# Patient Record
Sex: MALE | Race: WHITE
[De-identification: names, ages, dates, MRNs, and addresses within clinical notes are randomized per-mention and may not be internally consistent; named-entity substitution may affect disease eponyms.]

---

## 2018-02-15 ENCOUNTER — HOSPITAL ENCOUNTER (EMERGENCY)
Dept: HOSPITAL 7 - FB.ED | Age: 38
Discharge: HOME | End: 2018-02-15
Payer: MEDICARE

## 2018-02-15 VITALS — SYSTOLIC BLOOD PRESSURE: 128 MMHG | DIASTOLIC BLOOD PRESSURE: 83 MMHG

## 2018-02-15 DIAGNOSIS — E11.9: ICD-10-CM

## 2018-02-15 DIAGNOSIS — Z87.891: ICD-10-CM

## 2018-02-15 DIAGNOSIS — L03.213: Primary | ICD-10-CM

## 2018-02-16 NOTE — ER
DATE SEEN:  02/15/2018

 

TIME SEEN:  2245 hours.

 

CHIEF COMPLAINT:  Swelling of the left eye.

 

HISTORY OF PRESENT ILLNESS:  This is a 37-year-old male with swelling of the

left eye that started suddenly today with no trauma.  Has been mildly

irritating, and clear drainage has been noted that is thick, sometimes purulent.

As such, it has become fuzzy to see a bit blurry.  There has been no headache.

No nausea, vomiting, fever, or chills.

 

PAST MEDICAL HISTORY:  Type 2 diabetes.

 

ALLERGIES:  No known allergies.

 

SOCIAL HISTORY:  Smoker.

 

PHYSICAL EXAMINATION:  Vital Signs:  His blood pressure is normal.  He is

afebrile.  Eyes:  Conjunctivae are red.  Eyelids are swollen, there is

periorbital edema on the left.  Pupils otherwise equal and reactive, and

extraocular movements are intact.  Visual acuity is 20/50 on the left and 20/20

on the right.

 

IMPRESSION:  Periorbital cellulitis.

 

PLAN:  Warm compresses.  Cephalexin 500 mg p.o. t.i.d.  I advised him to see

Optometry tomorrow morning.  Return to the ED with any worsening of symptoms or

new symptoms.

 

Job#: [id number]

DD: 02/15/2018 2226

DT: 02/16/2018 0411 TN/AB

## 2018-05-27 ENCOUNTER — HOSPITAL ENCOUNTER (EMERGENCY)
Dept: HOSPITAL 7 - FB.ED | Age: 38
Discharge: HOME | End: 2018-05-27
Payer: MEDICARE

## 2018-05-27 VITALS — SYSTOLIC BLOOD PRESSURE: 133 MMHG | DIASTOLIC BLOOD PRESSURE: 61 MMHG

## 2018-05-27 DIAGNOSIS — J20.8: Primary | ICD-10-CM

## 2018-05-27 DIAGNOSIS — E11.9: ICD-10-CM

## 2018-05-27 DIAGNOSIS — F17.200: ICD-10-CM

## 2018-05-27 DIAGNOSIS — Z79.899: ICD-10-CM

## 2018-05-27 DIAGNOSIS — F32.9: ICD-10-CM

## 2018-05-27 DIAGNOSIS — F41.9: ICD-10-CM

## 2018-05-27 DIAGNOSIS — Z79.82: ICD-10-CM

## 2018-05-27 DIAGNOSIS — Z88.8: ICD-10-CM

## 2018-05-27 DIAGNOSIS — J45.909: ICD-10-CM

## 2018-05-27 DIAGNOSIS — I10: ICD-10-CM

## 2018-05-27 PROCEDURE — 94640 AIRWAY INHALATION TREATMENT: CPT

## 2018-05-27 PROCEDURE — 99283 EMERGENCY DEPT VISIT LOW MDM: CPT

## 2018-05-27 PROCEDURE — 71046 X-RAY EXAM CHEST 2 VIEWS: CPT

## 2018-05-27 NOTE — EDM.PDOC
ED HPI GENERAL MEDICAL PROBLEM





- General


Chief Complaint: Respiratory Problem


Stated Complaint: COUGH


Time Seen by Provider: 05/27/18 20:20


Source of Information: Reports: Patient, Family


History Limitations: Reports: No Limitations





- History of Present Illness


INITIAL COMMENTS - FREE TEXT/NARRATIVE: 





38 y.o.w.m vitaly a h/o asthma, smoker, came to the ed with acute onset of 

productive cough since yesterday. Pt has SOB when walking 4-5 blocks. No F/C/N/V

, dizziness or any other acute medical issues.  /71 RR 18 Pulse ox 95% on 

RA Temp 36.7 Pulse 86


Onset: Today


Onset Date: 05/27/18


Onset Time: 07:00


Duration: Hour(s):, Getting Worse, Intermittent


Location: Reports: Chest


Quality: Reports: Burning


Severity: Mild


Improves with: Reports: Rest


Worsens with: Reports: Movement


Context: Reports: Other (smoker)


Associated Symptoms: Reports: Cough, Shortness of Breath





- Related Data


 Allergies











Allergy/AdvReac Type Severity Reaction Status Date / Time


 


methylphenidate HCl Allergy  Anxiety Verified 05/27/18 21:14





[From Ritalin]     


 


pemoline [From Cylert] Allergy  Anxiety Verified 05/27/18 21:14











Home Meds: 


 Home Meds





Aspirin [Bonny Chewable] 81 mg PO DAILY 08/23/14 [History]


Fish Oil/Omega-3 Fatty Acids [Fish Oil] 3 cap PO DAILY 08/23/14 [History]


Lisinopril 10 mg PO DAILY 08/23/14 [History]


Multivitamin [Multivitamins] 1 each PO DAILY 08/23/14 [History]


Albuterol [Proair HFA] 2 puff IH Q4HR PRN 01/30/16 [History]


metFORMIN [Glucophage] 1,000 mg PO BID 01/30/16 [History]


Budesonide/Formoterol [Symbicort 160-4.5 MCG] 2 puff INH BID PRN 05/27/18 [

History]


Sulfamethoxazole/Trimethoprim [Bactrim Ds Tablet] 1 each PO BID #20 tablet 05/27 /18 [Rx]











Past Medical History





- Past Health History


Medical/Surgical History: Denies Medical/Surgical History


HEENT History: Reports: Impaired Vision


Cardiovascular History: Reports: Hypertension


Respiratory History: Reports: Asthma


Psychiatric History: Reports: Anxiety, Depression


Endocrine/Metabolic History: Reports: Diabetes, Type II





Social & Family History





- Family History


Family Medical History: Noncontributory





- Caffeine Use


Caffeine Use: Reports: Soda, Tea





ED ROS GENERAL





- Review of Systems


Review Of Systems: See Below


Constitutional: Reports: No Symptoms


HEENT: Reports: No Symptoms


Respiratory: Reports: Shortness of Breath, Wheezing, Cough, Sputum


Cardiovascular: Reports: No Symptoms


Endocrine: Reports: No Symptoms


GI/Abdominal: Reports: No Symptoms


: Reports: No Symptoms


Musculoskeletal: Reports: No Symptoms


Skin: Reports: No Symptoms


Neurological: Reports: No Symptoms


Psychiatric: Reports: No Symptoms


Hematologic/Lymphatic: Reports: No Symptoms


Immunologic: Reports: No Symptoms





ED EXAM, GENERAL





- Physical Exam


Exam: See Below


Exam Limited By: No Limitations


General Appearance: Alert, WD/WN, Mild Distress


Eye Exam: Bilateral Eye: Normal Inspection


Ears: Normal External Exam, Normal Canal, Hearing Grossly Normal


Ear Exam: Bilateral Ear: Auricle Normal


Nose: Normal Inspection, Normal Mucosa, No Blood


Throat/Mouth: Normal Inspection, Normal Lips, Normal Gums, Normal Oropharynx, 

Normal Voice, No Airway Compromise


Head: Atraumatic, Normocephalic


Neck: Normal Inspection, Supple, Non-Tender, Full Range of Motion


Respiratory/Chest: No Accessory Muscle Use, Chest Non-Tender, Respiratory 

Distress, Rhonchi, Wheezing


Cardiovascular: Normal Peripheral Pulses, Regular Rate, Rhythm, No Edema, No 

Gallop, No JVD, No Murmur, No Rub


Peripheral Pulses: 1+: Radial (R)


GI/Abdominal: Normal Bowel Sounds, Soft, Non-Tender, No Organomegaly, No 

Distention, No Abnormal Bruit, No Mass, Pelvis Stable


 (Male) Exam: Deferred


Rectal (Males) Exam: Deferred


Back Exam: Normal Inspection, Full Range of Motion


Extremities: Normal Inspection, Normal Range of Motion, Non-Tender, No Pedal 

Edema, Normal Capillary Refill


Neurological: Alert, Oriented, CN II-XII Intact, Normal Cognition, Normal Gait, 

No Motor/Sensory Deficits


Psychiatric: Normal Affect, Normal Mood


Skin Exam: Warm, Dry, Intact, Normal Color, No Rash


Lymphatic: No Adenopathy





Course





- Vital Signs


Text/Narrative:: 





38 y.o.w.m witth a h/o asthma, smoker, came to the ed with acute onset of 

productive cough since yesterday. Pt has SOB when walking 4-5 blocks. No F/C/N/V

, dizziness or any other acute medical issues.  /71 RR 18 Pulse ox 95% on 

RA Temp 36.7 Pulse 86


PE: 38 y.o.w.m  smoker with prod cough, acute onset


Imaging: CXR: NAD


Impression: Acute bronchitis, Asthma


Tx: Duineb, Bactrim DS


Reexam: Improved


Plan: D/C with instructions


Last Recorded V/S: 


 Last Vital Signs











Temp  36.8 C   05/27/18 21:30


 


Pulse  72   05/27/18 21:30


 


Resp  16   05/27/18 21:30


 


BP  133/61   05/27/18 21:30


 


Pulse Ox  97   05/27/18 21:30














- Orders/Labs/Meds


Orders: 


 Active Orders 24 hr











 Category Date Time Status


 


 RT Aerosol Therapy [RC] ASDIRECTED Care  05/27/18 20:28 Active


 


 Chest 2V [CR] Stat Exams  05/27/18 20:28 Taken











Meds: 


Medications














Discontinued Medications














Generic Name Dose Route Start Last Admin





  Trade Name Anneliese  PRN Reason Stop Dose Admin


 


Albuterol/Ipratropium  3 ml  05/27/18 20:28  05/27/18 20:33





  Duoneb 3.0-0.5 Mg/3 Ml  NEB  05/27/18 20:29  3 ml





  ONETIME ONE   Administration





     





     





     





     


 


Trimethoprim/Sulfamethoxazole  1 tab  05/27/18 21:18  05/27/18 22:09





  Septra Ds  PO  05/27/18 21:19  Not Given





  ONETIME ONE   





     





     





     





     














Departure





- Departure


Time of Disposition: 21:19


Disposition: Home, Self-Care 01


Condition: Good


Clinical Impression: 


Acute bronchitis


Qualifiers:


 Bronchitis organism: other organism Qualified Code(s): J20.8 - Acute 

bronchitis due to other specified organisms








- Discharge Information


Prescriptions: 


Sulfamethoxazole/Trimethoprim [Bactrim Ds Tablet] 1 each PO BID #20 tablet


Referrals: 


Romario Granger MD [Primary Care Provider] - 


Forms:  ED Department Discharge


Additional Instructions: 


Please quit tobacco use, please take the Abx as recommended, please use your 

albuterol inhalers every 4 hours a mary, please follow up with your PMD, come 

back if your symptoms get worse acutely. 





- My Orders


Last 24 Hours: 


My Active Orders





05/27/18 20:28


RT Aerosol Therapy [RC] ASDIRECTED 


Chest 2V [CR] Stat 














- Assessment/Plan


Last 24 Hours: 


My Active Orders





05/27/18 20:28


RT Aerosol Therapy [RC] ASDIRECTED 


Chest 2V [CR] Stat

## 2018-05-29 NOTE — CR
INDICATION:  Short of breath.



CHEST:  PA and lateral views of the chest were obtained 05/27/2018 - no 
comparisons.  Heart, mediastinum, and bony thorax were unremarkable.  A 
definite active infiltrate or effusion was not identified; however, bronchial 
wall cuffing is noted in the lower lung fields, which may be on the basis of 
active peribronchial disease and should be correlate clinically.



Prominent AP diameter and slightly flattened diaphragm leaf on the lateral view 
raises question of obstructive airway disease additionally.



IMPRESSION:  Bronchial wall cuffing with possible obstructive airway disease - 
correlate clinically.  

MTDD

## 2018-09-26 ENCOUNTER — HOSPITAL ENCOUNTER (EMERGENCY)
Dept: HOSPITAL 7 - FB.ED | Age: 38
Discharge: HOME | End: 2018-09-26
Payer: MEDICARE

## 2018-09-26 VITALS — SYSTOLIC BLOOD PRESSURE: 136 MMHG | DIASTOLIC BLOOD PRESSURE: 76 MMHG

## 2018-09-26 DIAGNOSIS — B34.9: Primary | ICD-10-CM

## 2018-09-26 DIAGNOSIS — Z88.8: ICD-10-CM

## 2018-09-26 DIAGNOSIS — I10: ICD-10-CM

## 2018-09-26 DIAGNOSIS — E66.9: ICD-10-CM

## 2018-09-26 PROCEDURE — 99283 EMERGENCY DEPT VISIT LOW MDM: CPT

## 2018-09-26 NOTE — EDM.PDOC
ED HPI GENERAL MEDICAL PROBLEM





- General


Chief Complaint: Respiratory Problem


Stated Complaint: COUGH


Time Seen by Provider: 09/26/18 18:45


Source of Information: Reports: Patient, Family


History Limitations: Reports: No Limitations





- History of Present Illness


INITIAL COMMENTS - FREE TEXT/NARRATIVE: 





c/o cough x 2d





h/o allergies when younger, does have inflammation c/w hayfever





nonproductive





no f/c/d





works at [employer]





here with s.o.





did want a written Rx's to mail to [facility] where he has no co-pay, 

requested a duplicate Rx to be mailed to [place], he will have additional 

meds for next time he develops a cough





no wheeze or evidence of secondary infection here





did prefer to be off work tonight





- Related Data


 Allergies











Allergy/AdvReac Type Severity Reaction Status Date / Time


 


methylphenidate HCl Allergy  Anxiety Verified 09/26/18 19:08





[From Ritalin]     


 


pemoline [From Cylert] Allergy  Anxiety Verified 09/26/18 19:08











Home Meds: 


 Home Meds





Aspirin [Bonny Chewable] 81 mg PO DAILY 08/23/14 [History]


Fish Oil/Omega-3 Fatty Acids [Fish Oil] 3 cap PO DAILY 08/23/14 [History]


Lisinopril 10 mg PO DAILY 08/23/14 [History]


Multivitamin [Multivitamins] 1 each PO DAILY 08/23/14 [History]


Benzonatate 100 mg PO TID #21 capsule 09/26/18 [Rx]


Loratadine 10 mg PO DAILY #30 tablet 09/26/18 [Rx]


sitaGLIPtin Phos/Metformin HCl [Janumet 50-1,000 MG] 1 tab PO BID 09/26/18 [

History]











Past Medical History





- Past Health History


Medical/Surgical History: Denies Medical/Surgical History


HEENT History: Reports: Impaired Vision


Cardiovascular History: Reports: Hypertension


Respiratory History: Reports: Asthma


Psychiatric History: Reports: Anxiety, Depression


Other Psychiatric History: Asperger's.


Endocrine/Metabolic History: Reports: Diabetes, Type II





Social & Family History





- Family History


Family Medical History: Noncontributory





- Caffeine Use


Caffeine Use: Reports: Soda, Tea





ED ROS GENERAL





- Review of Systems


Review Of Systems: See Below


Constitutional: Reports: No Symptoms


HEENT: Reports: No Symptoms


Respiratory: Reports: Cough.  Denies: Sputum


Cardiovascular: Reports: No Symptoms


Endocrine: Reports: No Symptoms


GI/Abdominal: Reports: No Symptoms


: Reports: No Symptoms


Musculoskeletal: Reports: No Symptoms


Skin: Reports: No Symptoms


Neurological: Reports: No Symptoms


Psychiatric: Reports: No Symptoms


Hematologic/Lymphatic: Reports: No Symptoms


Immunologic: Reports: No Symptoms





ED EXAM, GENERAL





- Physical Exam


Exam: See Below


Exam Limited By: No Limitations


General Appearance: Alert, WD/WN, No Apparent Distress


Eye Exam: Bilateral Eye: Other (mild injection conj b/l)


Nose: Other (mild swell)


Throat/Mouth: Normal Inspection, Normal Lips, Normal Teeth, Normal Gums, Normal 

Oropharynx, Normal Voice, No Airway Compromise


Head: Atraumatic, Normocephalic


Neck: Normal Inspection


Respiratory/Chest: No Respiratory Distress, Lungs Clear, Normal Breath Sounds, 

No Accessory Muscle Use, Other (no cough observed).  No: Wheezing


Cardiovascular: Regular Rate, Rhythm, No Edema, No Gallop, No JVD, No Murmur, 

No Rub


Back Exam: Normal Inspection, Full Range of Motion, NT


Extremities: Normal Inspection, Normal Range of Motion, Non-Tender, No Pedal 

Edema


Neurological: Alert, Oriented, CN II-XII Intact, Normal Cognition, No Motor/

Sensory Deficits


Psychiatric: Normal Affect, Normal Mood


Skin Exam: Warm, Dry, Intact, Normal Color, No Rash


Lymphatic: No Adenopathy





Departure





- Departure


Time of Disposition: 19:07


Disposition: Home, Self-Care 01


Condition: Good


Clinical Impression: 


 Viral syndrome








- Discharge Information


*PRESCRIPTION DRUG MONITORING PROGRAM REVIEWED*: Not Applicable


*COPY OF PRESCRIPTION DRUG MONITORING REPORT IN PATIENT TEODORA: Not Applicable


Prescriptions: 


Benzonatate 100 mg PO TID #21 capsule


Loratadine 10 mg PO DAILY #30 tablet


Instructions:  Upper Respiratory Infection, Adult, Easy-to-Read


Referrals: 


Romario Granger MD [Primary Care Provider] - 


Forms:  ED Department Discharge, ED Return to Work/School Form


Additional Instructions: 


You have a viral infection and likely have seasonal allergy as well.





For cough, take benzonatate 100 mg 1 capsule 3 times a day for 7 days.





For drainage, take loratadine 10 mg 1 tab daily for 30 days.





Rest tonight, no work.





See your doctor in 1 week.





Call your Physician or Return to Emergency Department if:





   * Your condition worsens in any way.


   * You develop fever greater than 100.4.


   * You have vomitting that does not stop with medications.


   * You have pain that is not controlled with medications.